# Patient Record
Sex: MALE | Race: BLACK OR AFRICAN AMERICAN | NOT HISPANIC OR LATINO
[De-identification: names, ages, dates, MRNs, and addresses within clinical notes are randomized per-mention and may not be internally consistent; named-entity substitution may affect disease eponyms.]

---
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
## 2018-06-30 ENCOUNTER — CHARTING TRANS (OUTPATIENT)
Dept: OTHER | Age: 18
End: 2018-06-30

## 2018-10-31 VITALS
RESPIRATION RATE: 18 BRPM | HEIGHT: 71 IN | TEMPERATURE: 97.7 F | OXYGEN SATURATION: 100 % | BODY MASS INDEX: 20.05 KG/M2 | HEART RATE: 81 BPM | WEIGHT: 143.19 LBS | SYSTOLIC BLOOD PRESSURE: 132 MMHG | DIASTOLIC BLOOD PRESSURE: 95 MMHG

## 2018-11-03 VITALS
SYSTOLIC BLOOD PRESSURE: 110 MMHG | WEIGHT: 150.46 LBS | DIASTOLIC BLOOD PRESSURE: 70 MMHG | OXYGEN SATURATION: 100 % | BODY MASS INDEX: 21.06 KG/M2 | HEART RATE: 76 BPM | HEIGHT: 71 IN

## 2021-04-18 ENCOUNTER — HOSPITAL ENCOUNTER (EMERGENCY)
Facility: HOSPITAL | Age: 21
Discharge: HOME | End: 2021-04-18
Attending: EMERGENCY MEDICINE
Payer: COMMERCIAL

## 2021-04-18 ENCOUNTER — APPOINTMENT (OUTPATIENT)
Dept: CT IMAGING | Facility: HOSPITAL | Age: 21
End: 2021-04-18
Payer: COMMERCIAL

## 2021-04-18 VITALS
OXYGEN SATURATION: 98 % | WEIGHT: 146 LBS | HEIGHT: 71 IN | RESPIRATION RATE: 14 BRPM | BODY MASS INDEX: 20.44 KG/M2 | DIASTOLIC BLOOD PRESSURE: 91 MMHG | SYSTOLIC BLOOD PRESSURE: 141 MMHG | HEART RATE: 91 BPM

## 2021-04-18 DIAGNOSIS — M79.5 SOFT TISSUES FOREIGN BODY: ICD-10-CM
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
## 2021-04-18 DIAGNOSIS — S00.81XA FOREHEAD ABRASION, INITIAL ENCOUNTER: ICD-10-CM

## 2021-04-18 PROCEDURE — 70450 CT HEAD/BRAIN W/O DYE: CPT

## 2021-04-18 PROCEDURE — 80048 BASIC METABOLIC PNL TOTAL CA: CPT | Performed by: EMERGENCY MEDICINE

## 2021-04-18 PROCEDURE — 99291 CRITICAL CARE FIRST HOUR: CPT

## 2021-04-18 PROCEDURE — 85025 COMPLETE CBC W/AUTO DIFF WBC: CPT | Performed by: EMERGENCY MEDICINE

## 2021-04-18 PROCEDURE — 36415 COLL VENOUS BLD VENIPUNCTURE: CPT

## 2021-04-18 PROCEDURE — 99284 EMERGENCY DEPT VISIT MOD MDM: CPT

## 2021-04-18 RX ORDER — IBUPROFEN 600 MG/1
600 TABLET ORAL ONCE
Status: COMPLETED | OUTPATIENT
Start: 2021-04-18 | End: 2021-04-18

## 2021-04-19 NOTE — CONSULTS
[facility]    Report of Consultation    Chris Fay Patient Status:  Emergency    10/10/2000 MRN [id number]   Location [address] Attending Rik Adan MD   Hosp Day # 0 PCP No primary care provider on f peritoneal signs. No ascites. No wounds  Extremities:  No lower extremity edema noted. Without clubbing or cyanosis. no wounds  Lymphatic:  No palpable cervical lymphadenopathy. Neurologic: Cranial nerves are grossly intact.   Motor strength and sensory e
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
## 2021-04-19 NOTE — CM/SW NOTE
Patient's mom called by patient she is on her way to [facility] address and support given.
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
